# Patient Record
Sex: FEMALE | Employment: FULL TIME
[De-identification: names, ages, dates, MRNs, and addresses within clinical notes are randomized per-mention and may not be internally consistent; named-entity substitution may affect disease eponyms.]

---

## 2023-03-24 ENCOUNTER — HOSPITAL ENCOUNTER (OUTPATIENT)
Facility: HOSPITAL | Age: 38
Setting detail: RECURRING SERIES
Discharge: HOME | End: 2023-03-27
Payer: OTHER GOVERNMENT

## 2023-03-24 PROCEDURE — 97162 PT EVAL MOD COMPLEX 30 MIN: CPT

## 2023-03-24 PROCEDURE — 97535 SELF CARE MNGMENT TRAINING: CPT

## 2023-03-24 PROCEDURE — 97110 THERAPEUTIC EXERCISES: CPT

## 2023-03-25 NOTE — PROGRESS NOTES
limitation and / or participation in recreation  ;Presentation:  MEDIUM Complexity : Evolving with changing characteristics  ; Clinical Decision Making:  MEDIUM Complexity : FOTO score of 26-74 FOTO score = an established functional score where 100 = no disability  Overall Complexity Rating: MEDIUM  Problem List: pain affecting function, decrease ROM, decrease strength, impaired gait/balance, decrease ADL/functional abilities, decrease activity tolerance, decrease flexibility/joint mobility, and decrease transfer abilities    Treatment Plan may include any combination of the followin Therapeutic Exercise, 06466 Neuromuscular Re-Education, 06869 Manual Therapy, 79415 Therapeutic Activity, 82762 Self Care/Home Management, 31845 Electrical Stim unattended, 57411 Electrical Stim attended, 89971 Vasopneumatic Device, 28964 Gait Training, [id number] Ultrasound, [id number] Needle Insertion w/o Injection (1 or 2 muscles), and 32782 Needle Insertion w/o Injection (3+ muscles)  Patient / Family readiness to learn indicated by: asking questions, trying to perform skills, interest, return verbalization , and return demonstration   Persons(s) to be included in education: patient (P)  Barriers to Learning/Limitations: none  Measures taken if barriers to learning present: n/a  Patient Goal (s): \"to be mobile with little to no pain and be able to workout with pain behind my knee\"  Patient Self Reported Health Status: good  Rehabilitation Potential: good     Short Term Goals: To be accomplished in 2 treatments:     1. Patient will become proficient in their HEP and will be compliant in performing that program.     Evaluation: HEP established        Long Term Goals: To be accomplished in 5 weeks:     1. Patient's pain level will be 0-3/10 with activity in order to improve patient's ability to perform normal ADLs. Evaluation: 2-9/10       2.  Patient will demonstrate MMT right hip abd >/= 4+/5 in order to perform ADLs with greater
Tightness= [] WNL   [] Min   [] Mod   [] Severe  Gastroc:    (L) Tightness= [] WNL   [] Min   [] Mod   [] Severe    (R) Tightness= [] WNL   [] Min   [] Mod   [] Severe                                  90/90 HS:  [place]:  Pamela:    Joint Mobility:  Hypomobility of *** /UPAs  Acetabulofemoral: Hypomobility w/ inf/lateral glides  Patellar: Hypomobility w/ inf<>sup, med<>lateral glides  Tibiofemoral: Hypomobility w/ AP/PA glides  Prox Tibiofiibular:  Distal Tibiofiibular:  Talocrural:  Subtalar:    Neural Tension:  Slump:       Special Tests:  SLR:  FABIR/FADIR:  LE Long Axis Distraction    [place]len:  Thigh Thrust  Scours:    Varus (at 30 deg):  Valgus (at 30 deg): Ant Drawer:  Post Drawer:  Starleen Muff: Geraldo:  Apley Compression:    Ant Drawer: Talar Tilt:       Pain Level (0-10 scale) post treatment: ***    ASSESSMENT/Changes in Function:     Patient will continue to benefit from skilled PT services to {[facility]:40501} to address functional deficits and attain remaining goals.     []  See Plan of Care - for goals and assessment     PLAN  []  Upgrade activities as tolerated     [x]  Continue plan of care  []  Update interventions per flow sheet       []  Other:_      Dayan Aguirre PT 3/24/2023  9:09 AM

## 2023-04-13 ENCOUNTER — HOSPITAL ENCOUNTER (OUTPATIENT)
Facility: HOSPITAL | Age: 38
Setting detail: RECURRING SERIES
Discharge: HOME | End: 2023-04-16
Payer: OTHER GOVERNMENT

## 2023-04-13 PROCEDURE — 97110 THERAPEUTIC EXERCISES: CPT

## 2023-04-13 PROCEDURE — 97530 THERAPEUTIC ACTIVITIES: CPT

## 2023-04-13 PROCEDURE — 97112 NEUROMUSCULAR REEDUCATION: CPT

## 2023-04-17 ENCOUNTER — HOSPITAL ENCOUNTER (OUTPATIENT)
Facility: HOSPITAL | Age: 38
Setting detail: RECURRING SERIES
Discharge: HOME | End: 2023-04-20
Payer: OTHER GOVERNMENT

## 2023-04-17 PROCEDURE — 97140 MANUAL THERAPY 1/> REGIONS: CPT

## 2023-04-17 PROCEDURE — 97530 THERAPEUTIC ACTIVITIES: CPT

## 2023-04-17 PROCEDURE — 97110 THERAPEUTIC EXERCISES: CPT

## 2023-04-17 NOTE — PROGRESS NOTES
BRIE Orlando PTA [facility]   4/28/2023  7:30 AM Maryanne South PTA [facility]

## 2023-04-18 ENCOUNTER — HOSPITAL ENCOUNTER (OUTPATIENT)
Facility: HOSPITAL | Age: 38
Setting detail: RECURRING SERIES
Discharge: HOME | End: 2023-04-21
Payer: OTHER GOVERNMENT

## 2023-04-18 PROCEDURE — 97110 THERAPEUTIC EXERCISES: CPT

## 2023-04-18 PROCEDURE — 97140 MANUAL THERAPY 1/> REGIONS: CPT

## 2023-04-18 PROCEDURE — 97530 THERAPEUTIC ACTIVITIES: CPT

## 2023-04-18 NOTE — PROGRESS NOTES
services to modify and progress therapeutic interventions, analyze and address functional mobility deficits, analyze and address ROM deficits, analyze and address strength deficits, analyze and address soft tissue restrictions, analyze and cue for proper movement patterns, analyze and modify for postural abnormalities, and instruct in home and community integration to address functional deficits and attain remaining goals. Progress toward goals / Updated goals:  []  See Progress Note/Recertification  1. Patient will become proficient in their HEP and will be compliant in performing that program.     Evaluation: HEP established  Current:ongoing reports initial compliance 4/13/23      Long Term Goals: To be accomplished in 5 weeks:     1. Patient's pain level will be 0-3/10 with activity in order to improve patient's ability to perform normal ADLs. Evaluation: 2-9/10    CURRENT    3  4/18/23     2. Patient will demonstrate MMT right hip abd >/= 4+/5 in order to perform ADLs with greater ease    Evaluation: Right hip abd MMT: 3/5   CURRENT Ongoing NA 4/13/23  3. Patient will increase FOTO score to 63 points to indicate increased functional mobility. Evaluation: 49 points      CURRENT Ongoing NA 4/13/23  4. Patient will demonstrate pain free left knee flex AROM >/= 120 degrees in order to perform ADLs with greater ease.      Evaluation:   Left Knee Flex AROM: 112 deg   CURRENT ongoing NA  4/13/23         PLAN  Yes  Continue plan of care  []  Upgrade activities as tolerated  []  Discharge due to :  []  Other:    Sabiha Suárez PTA    4/18/2023    9:01 AM    Future Appointments   Date Time Provider Brannon Lee   4/26/2023  8:10 AM Sabiha Suárez PTA [facility]   4/28/2023  7:30 AM [facility]

## 2023-04-26 ENCOUNTER — HOSPITAL ENCOUNTER (OUTPATIENT)
Facility: HOSPITAL | Age: 38
Setting detail: RECURRING SERIES
Discharge: HOME | End: 2023-04-29
Payer: OTHER GOVERNMENT

## 2023-04-26 PROCEDURE — 97110 THERAPEUTIC EXERCISES: CPT

## 2023-04-26 PROCEDURE — 97530 THERAPEUTIC ACTIVITIES: CPT

## 2023-04-26 NOTE — PROGRESS NOTES
PHYSICAL / OCCUPATIONAL THERAPY - DAILY TREATMENT NOTE (updated )    Patient Name: Renny Simon    Date: 2023    : 1985  Insurance: Payor:  EAST / Plan: [employer] EAST / Product Type: *No Product type* /      Patient  verified Yes     Visit #   Current / Total 6 10   Time   In / Out 8:12 9:00   Pain   In / Out 5 0   Subjective Functional Status/Changes: \"I went back to gym. \"   Changes to:  Meds, Allergies, Med Hx, Sx Hx? If yes, update Summary List no       TREATMENT AREA =  Pain in right knee [M25.561]  Pain in left knee [M25.562]    OBJECTIVE    Modalities Rationale:     decrease pain to improve patient's ability to progress to PLOF and address remaining functional goals. min [] Estim Unattended, type/location:                                      []  w/ice    []  w/heat    min [] Estim Attended, type/location:                                     []  w/US     []  w/ice    []  w/heat    []  TENS insruct      min []  Mechanical Traction: type/lbs                   []  pro   []  sup   []  int   []  cont    []  before manual    []  after manual    min []  Ultrasound, settings/location:      min []  Iontophoresis w/ dexamethasone, location:                                               []  take home patch       []  in clinic   10 min  unbill [x]  Ice     []  Heat    location/position: (B) knees  long sit    min []  Paraffin,  details:     min []  Vasopneumatic Device, press/temp:     min []  Berkeley Peaches / Manette Fairly: If using vaso (only need to measure limb vaso being performed on)      pre-treatment girth :       post-treatment girth :       measured at (landmark location) :      min []  Other:    Skin assessment post-treatment (if applicable):    []  intact    []  redness- no adverse reaction                 []redness - adverse reaction:         Therapeutic Procedures:     Tx Min Billable or 1:1 Min (if diff from Tx Min) Procedure, Rationale, Specifics   15  46763 Therapeutic Exercise

## 2023-04-26 NOTE — PROGRESS NOTES
[address] [place] [address] PHYSICAL THERAPY  [address] [place] [address], [address] [place] [address],[facility] 300  [phone] AN:[phone]  PHYSICAL THERAPY PROGRESS NOTE  Patient Name: Jenny  : 1985   Treatment/Medical Diagnosis: Pain in right knee [M25.561]  Pain in left knee [M25.562]   Referral Source: SUSAN Carcamo     Date of Initial Visit: 3/24/23 Attended Visits: 6 Missed Visits: 0     SUMMARY OF TREATMENT  Pt has attended 6 visits,including initial evaluation for (B) knee pain. PT intervention has consisted of therapeutic, exercises functional activities, manual therapy, neuromuscular re-education,HEP to improve ROM, mobility,flexibility, and improve pt's ability to squat,bend, and lift increasing pt's ability to perform ADL's and occupational demands. CP utilized for pain management. CURRENT STATUS  Pt reports 25% improvement since initial eval. FOTO has improved from 49 to 51 which shows some improvement since initial eval. MMT for right hip abduction and AROM left knee have improved. Pain level on average is 4/10 and on a busy day it increases to 6-7/10. Pt is able to perform normal workout routine with less repetition since she started PT. Pt reports difficulty with ascending stairs, standing from seated position causes pain, and buckling when she tries to stand up from seated position      Progress Towards Goals  1. Patient will become proficient in their HEP and will be compliant in performing that program.     Evaluation: HEP established  Current:ongoing reports initial compliance 23      Long Term Goals: To be accomplished in 5 weeks:     1. Patient's pain level will be 0-3/10 with activity in order to improve patient's ability to perform normal ADLs. Evaluation: 2-9/10    CURRENT  5-6     23 Progressing     2.  Patient will demonstrate MMT right hip abd >/= 4+/5 in order to perform ADLs with greater ease    Evaluation: Right hip abd MMT: 3/5

## 2023-04-28 ENCOUNTER — HOSPITAL ENCOUNTER (OUTPATIENT)
Facility: HOSPITAL | Age: 38
Setting detail: RECURRING SERIES
End: 2023-04-28
Payer: OTHER GOVERNMENT

## 2023-05-10 ENCOUNTER — HOSPITAL ENCOUNTER (OUTPATIENT)
Facility: HOSPITAL | Age: 38
Setting detail: RECURRING SERIES
Discharge: HOME | End: 2023-05-13
Payer: OTHER GOVERNMENT

## 2023-05-10 PROCEDURE — 97530 THERAPEUTIC ACTIVITIES: CPT

## 2023-05-10 PROCEDURE — 97112 NEUROMUSCULAR REEDUCATION: CPT

## 2023-05-10 PROCEDURE — 97110 THERAPEUTIC EXERCISES: CPT

## 2023-05-10 NOTE — PROGRESS NOTES
Rationale, Specifics   24  68735 Therapeutic Exercise (timed):  increase ROM, strength, coordination, balance, and proprioception to improve patient's ability to progress to PLOF and address remaining functional goals. (see flow sheet as applicable)     Details if applicable:       15  60564 Therapeutic Activity (timed):  use of dynamic activities replicating functional movements to increase ROM, strength, coordination, balance, and proprioception in order to improve patient's ability to progress to PLOF and address remaining functional goals. (see flow sheet as applicable)     Details if applicable:     8  82350 Neuromuscular Re-Education (timed):  improve balance, coordination, kinesthetic sense, posture, core stability and proprioception to improve patient's ability to develop conscious control of individual muscles and awareness of position of extremities in order to progress to PLOF and address remaining functional goals. (see flow sheet as applicable)     Details if applicable:            Details if applicable:            Details if applicable:     52 47 [facility] Totals Reminder: bill using total billable min of TIMED therapeutic procedures (example: do not include dry needle or estim unattended, both untimed codes, in totals to left)  8-22 min = 1 unit; 23-37 min = 2 units; 38-52 min = 3 units; 53-67 min = 4 units; 68-82 min = 5 units   Total Total     [x]  Patient Education billed concurrently with other procedures   [x] Review HEP    [] Progressed/Changed HEP, detail:    [] Other detail:       Objective Information/Functional Measures/Assessment    VC exercises and technique  Added 1 1/2# to standing ham curl and 3 way hip 10x each    Tolerated well with intermittent reports of right knee pain with exercises and activities    Residual C/O primarily right knee pain. Showing increase strength with addition of 1 1/2# with PRES in standing.          Patient will continue to benefit from skilled PT / OT services

## 2023-05-17 ENCOUNTER — HOSPITAL ENCOUNTER (OUTPATIENT)
Facility: HOSPITAL | Age: 38
Setting detail: RECURRING SERIES
Discharge: HOME | End: 2023-05-20
Payer: OTHER GOVERNMENT

## 2023-05-17 ENCOUNTER — TELEPHONE (OUTPATIENT)
Facility: HOSPITAL | Age: 38
End: 2023-05-17

## 2023-05-17 PROCEDURE — 97530 THERAPEUTIC ACTIVITIES: CPT

## 2023-05-17 PROCEDURE — 97112 NEUROMUSCULAR REEDUCATION: CPT

## 2023-05-17 PROCEDURE — 97110 THERAPEUTIC EXERCISES: CPT

## 2023-05-17 NOTE — TELEPHONE ENCOUNTER
Patient cancelled appt. on 5/17/23 at 8:23am due to the patient being out of town and she is not able to make her appt.

## 2023-05-17 NOTE — PROGRESS NOTES
PHYSICAL / OCCUPATIONAL THERAPY - DAILY TREATMENT NOTE (updated )    Patient Name: Emily Ho    Date: 2023    : 1985  Insurance: Payor: [employer] EAST / Plan: [employer] EAST / Product Type: *No Product type* /      Patient  verified Yes     Visit #   Current / Total 2 8   Time   In / Out 732 822   Pain   In / Out B 3-4  2 \"looser\"   Subjective Functional Status/Changes: Reports her knees both feel achy today and she is having intermittent sharp pains. Changes to:  Meds, Allergies, Med Hx, Sx Hx? If yes, update Summary List no       TREATMENT AREA =  Pain in right knee [M25.561]  Pain in left knee [M25.562]    OBJECTIVE    Modalities Rationale:     decrease pain and increase tissue extensibility to improve patient's ability to progress to PLOF and address remaining functional goals. min [] Estim Unattended, type/location:                                      []  w/ice    []  w/heat    min [] Estim Attended, type/location:                                     []  w/US     []  w/ice    []  w/heat    []  TENS insruct      min []  Mechanical Traction: type/lbs                   []  pro   []  sup   []  int   []  cont    []  before manual    []  after manual    min []  Ultrasound, settings/location:      min []  Iontophoresis w/ dexamethasone, location:                                               []  take home patch       []  in clinic   10 min  unbill []  Ice     [x]  Heat  post   location/position:reclined, B knees     min []  Paraffin,  details:     min []  Vasopneumatic Device, press/temp:     min []  Jeannie Kahn / Cora Charles: If using vaso (only need to measure limb vaso being performed on)      pre-treatment girth :       post-treatment girth :       measured at (landmark location) :      min []  Other:    Skin assessment post-treatment (if applicable):    []  intact    []  redness- no adverse reaction                 []redness - adverse reaction:         Therapeutic Procedures:     Tx Min

## 2023-05-19 ENCOUNTER — HOSPITAL ENCOUNTER (OUTPATIENT)
Facility: HOSPITAL | Age: 38
Setting detail: RECURRING SERIES
Discharge: HOME | End: 2023-05-22
Payer: OTHER GOVERNMENT

## 2023-05-19 PROCEDURE — 97112 NEUROMUSCULAR REEDUCATION: CPT

## 2023-05-19 PROCEDURE — 97110 THERAPEUTIC EXERCISES: CPT

## 2023-05-19 PROCEDURE — 97530 THERAPEUTIC ACTIVITIES: CPT

## 2023-05-23 ENCOUNTER — APPOINTMENT (OUTPATIENT)
Facility: HOSPITAL | Age: 38
End: 2023-05-23
Payer: OTHER GOVERNMENT

## 2023-05-30 ENCOUNTER — HOSPITAL ENCOUNTER (OUTPATIENT)
Facility: HOSPITAL | Age: 38
Setting detail: RECURRING SERIES
Discharge: HOME | End: 2023-06-02
Payer: OTHER GOVERNMENT

## 2023-05-30 PROCEDURE — 97530 THERAPEUTIC ACTIVITIES: CPT

## 2023-05-30 PROCEDURE — 97110 THERAPEUTIC EXERCISES: CPT

## 2023-05-30 NOTE — PROGRESS NOTES
[facility] PHYSICAL THERAPY  [address] [place] [address], [address] [place] [address],[place] [address]  BV:[phone] OC:[phone]  PHYSICAL THERAPY PROGRESS NOTE  Patient Name: Winnie Obregon : 1985   Treatment/Medical Diagnosis: Pain in right knee [M25.561]  Pain in left knee [M25.562]   Referral Source: SUSAN Lan     Date of Initial Visit: 3/24/23 Attended Visits: 10 Missed Visits: 1     SUMMARY OF TREATMENT  Pt has attended 10 visits,including initial evaluation for (B) knee pain. PT intervention has consisted of therapeutic, exercises functional activities, manual therapy, neuromuscular re-education,HEP to improve ROM, mobility,flexibility, and strength to improve pt's ability to squat,bend, and lift increasing pt's ability to perform ADL's and occupational demands.  utilized for pain management. CURRENT STATUS  Ms. Chirag Nino has shown good progress with PT. Pt reports 50% improvement since initial eval. Pain level on average is 5/10. FOTO has improved from 49 to 61 which shows improved in functional mobility. Pt is able to perform workouts,light jog and squat. Pt reports difficulty with running and ascending/descending stairs. Progress Note/Recertification  1. Pt will be able to ascend stairs with minimal to no pain. PN  mod  difficulty  CURRENT 6\" step up and downs 10x each B forward and lateral 5/10/23   held step up and downs and stairs due to increase pain B knees today 23    mod difficulty  23  2. Pt will able to stand with no buckling or pain at (B) knees from any surface. PN  not assessed   CURRENT     buckling some times  from seated position  when getting up   23  3. Patient will increase FOTO score to 63 points to indicate increased functional mobility.      PN  = 51  CURRENT  61  23       FOTO:  61  Functional Gains: workouts, light jog, squats  Functional Deficits: running, stairs  % improvement: 50%  Pain   Average: 5/10       Best:

## 2023-05-30 NOTE — PROGRESS NOTES
PHYSICAL / OCCUPATIONAL THERAPY - DAILY TREATMENT NOTE (updated )    Patient Name: Melba Bonilla    Date: 2023    : 1985  Insurance: Payor: [employer] EAST / Plan: [employer] EAST / Product Type: *No Product type* /      Patient  verified Yes     Visit #   Current / Total 1 8   Time   In / Out 7:42 8:21   Pain   In / Out 2 2   Subjective Functional Status/Changes: \"Im having most of my pain with the stairs. \"   Changes to:  Meds, Allergies, Med Hx, Sx Hx? If yes, update Summary List no       TREATMENT AREA =  Pain in right knee [M25.561]  Pain in left knee [M25.562]    OBJECTIVE    Modalities Rationale:     decrease pain to improve patient's ability to progress to PLOF and address remaining functional goals. min [] Estim Unattended, type/location:                                      []  w/ice    []  w/heat    min [] Estim Attended, type/location:                                     []  w/US     []  w/ice    []  w/heat    []  TENS insruct      min []  Mechanical Traction: type/lbs                   []  pro   []  sup   []  int   []  cont    []  before manual    []  after manual    min []  Ultrasound, settings/location:      min []  Iontophoresis w/ dexamethasone, location:                                               []  take home patch       []  in clinic   10 min  unbill [x]  Ice     []  Heat    location/position: Supine (B) knees    min []  Paraffin,  details:     min []  Vasopneumatic Device, press/temp:     min []  Erica Jaclyn / Cheyenne Savant: If using vaso (only need to measure limb vaso being performed on)      pre-treatment girth :       post-treatment girth :       measured at (landmark location) :      min []  Other:    Skin assessment post-treatment (if applicable):    []  intact    []  redness- no adverse reaction                 []redness - adverse reaction:         Therapeutic Procedures:     Tx Min Billable or 1:1 Min (if diff from Tx Min) Procedure, Rationale, Specifics   [phone] [id number]

## 2023-06-01 ENCOUNTER — HOSPITAL ENCOUNTER (OUTPATIENT)
Facility: HOSPITAL | Age: 38
Setting detail: RECURRING SERIES
Discharge: HOME | End: 2023-06-04
Payer: OTHER GOVERNMENT

## 2023-06-01 PROCEDURE — 97112 NEUROMUSCULAR REEDUCATION: CPT

## 2023-06-01 PROCEDURE — 97110 THERAPEUTIC EXERCISES: CPT

## 2023-06-01 PROCEDURE — 97530 THERAPEUTIC ACTIVITIES: CPT

## 2023-06-06 ENCOUNTER — HOSPITAL ENCOUNTER (OUTPATIENT)
Facility: HOSPITAL | Age: 38
Setting detail: RECURRING SERIES
Discharge: HOME | End: 2023-06-09
Payer: OTHER GOVERNMENT

## 2023-06-06 PROCEDURE — 97110 THERAPEUTIC EXERCISES: CPT

## 2023-06-06 PROCEDURE — 97112 NEUROMUSCULAR REEDUCATION: CPT

## 2023-06-06 PROCEDURE — 97530 THERAPEUTIC ACTIVITIES: CPT

## 2023-06-06 NOTE — PROGRESS NOTES
TM distance . 16miles with speed 2.0 x 5 minutes today. Patient will continue to benefit from skilled PT / OT services to modify and progress therapeutic interventions, analyze and address ROM deficits, analyze and address strength deficits, analyze and address soft tissue restrictions, analyze and cue for proper movement patterns, analyze and modify for postural abnormalities, and instruct in home and community integration to address functional deficits and attain remaining goals. Progress toward goals / Updated goals:  []  See Progress Note/Recertification    New Goals to be achieved in ___5_ treatments  1. 1. Pt will be able to ascend stairs with minimal to no pain  PN  mod difficulty  5/30/23  CURRENT 8\" stairs 3x reciprocal pattern with reports of increase pain to 4/10 6/6/23    2. 2. Pt will able to stand with no buckling or pain at (B) knees from any surface. PN  buckling some times  from seated position  when getting up    CURRENT reports difficulty rising up from lunge walks 6/1/23   lung walks not performed today, goal NA 6/6/23    3. 3. Patient will increase FOTO score to 63 points to indicate increased functional mobility.      PN 61  CURRENT NA 6/6/23    PLAN  Yes  Continue plan of care  [x]  Upgrade activities as tolerated  []  Discharge due to :  []  Other:    Edwin Samayoa PT    6/6/2023    7:37 AM    Future Appointments   Date Time Provider Brannon Lee   6/8/2023  7:30 AM [facility]

## 2023-06-27 ENCOUNTER — HOSPITAL ENCOUNTER (OUTPATIENT)
Facility: HOSPITAL | Age: 38
Setting detail: RECURRING SERIES
Discharge: HOME | End: 2023-06-30
Payer: OTHER GOVERNMENT

## 2023-06-27 PROCEDURE — 97110 THERAPEUTIC EXERCISES: CPT

## 2023-06-27 PROCEDURE — 97530 THERAPEUTIC ACTIVITIES: CPT

## 2023-07-13 ENCOUNTER — APPOINTMENT (OUTPATIENT)
Facility: HOSPITAL | Age: 38
End: 2023-07-13
Payer: OTHER GOVERNMENT

## 2023-07-20 ENCOUNTER — HOSPITAL ENCOUNTER (OUTPATIENT)
Facility: HOSPITAL | Age: 38
Setting detail: RECURRING SERIES
Discharge: HOME | End: 2023-07-23
Payer: OTHER GOVERNMENT

## 2023-07-20 ENCOUNTER — APPOINTMENT (OUTPATIENT)
Facility: HOSPITAL | Age: 38
End: 2023-07-20
Payer: OTHER GOVERNMENT

## 2023-07-20 PROCEDURE — 97140 MANUAL THERAPY 1/> REGIONS: CPT

## 2023-07-20 PROCEDURE — 97110 THERAPEUTIC EXERCISES: CPT

## 2023-07-20 NOTE — PROGRESS NOTES
PHYSICAL / OCCUPATIONAL THERAPY - DAILY TREATMENT NOTE (updated )    Patient Name: Marty Henderson    Date: 2023    : 1985  Insurance: Payor:  EAST / Plan: [employer] EAST / Product Type: *No Product type* /      Patient  verified Yes     Visit #   Current / Total 1 10   Time   In / Out 935 am  1025 am    Pain   In / Out 4-5/10 2/10    Subjective Functional Status/Changes: Patient reports having increased (B) knee pain when up from a seated position and when performing stairs. Patient describes the pain as being achy. TREATMENT AREA =  Pain in right knee [M25.561]  Pain in left knee [M25.562]    OBJECTIVE    Modalities Rationale:     decrease inflammation, decrease pain, and increase tissue extensibility to improve patient's ability to progress to PLOF and address remaining functional goals. min [] Estim Unattended, type/location:                                      []  w/ice    []  w/heat    min [] Estim Attended, type/location:                                     []  w/US     []  w/ice    []  w/heat    []  TENS insruct      min []  Mechanical Traction: type/lbs                   []  pro   []  sup   []  int   []  cont    []  before manual    []  after manual    min []  Ultrasound, settings/location:      min []  Iontophoresis w/ dexamethasone, location:                                               []  take home patch       []  in clinic   10 min  unbill []  Ice     [x]  Heat    location/position: Reclined to (B) knee     min []  Paraffin,  details:     min []  Vasopneumatic Device, press/temp:     min []  Derek Grover / Asiya Hernandez:     If using vaso (only need to measure limb vaso being performed on)      pre-treatment girth :       post-treatment girth :       measured at (landmark location) :      min []  Other:    Skin assessment post-treatment (if applicable):    []  intact    []  redness- no adverse reaction                 []redness - adverse reaction:         Therapeutic

## 2023-07-24 ENCOUNTER — HOSPITAL ENCOUNTER (OUTPATIENT)
Facility: HOSPITAL | Age: 38
Setting detail: RECURRING SERIES
Discharge: HOME | End: 2023-07-27
Payer: OTHER GOVERNMENT

## 2023-07-24 PROCEDURE — 97110 THERAPEUTIC EXERCISES: CPT

## 2023-07-24 PROCEDURE — 97530 THERAPEUTIC ACTIVITIES: CPT

## 2023-07-27 ENCOUNTER — HOSPITAL ENCOUNTER (OUTPATIENT)
Facility: HOSPITAL | Age: 38
Setting detail: RECURRING SERIES
Discharge: HOME | End: 2023-07-30
Payer: OTHER GOVERNMENT

## 2023-07-27 PROCEDURE — 97530 THERAPEUTIC ACTIVITIES: CPT

## 2023-07-27 PROCEDURE — 97140 MANUAL THERAPY 1/> REGIONS: CPT

## 2023-07-27 NOTE — PROGRESS NOTES
PHYSICAL / OCCUPATIONAL THERAPY - DAILY TREATMENT NOTE (updated )    Patient Name: Nikki Hernández    Date: 2023    : 1985  Insurance: Payor: [employer] EAST / Plan: [employer] EAST / Product Type: *No Product type* /      Patient  verified Yes     Visit #   Current / Total 3 10   Time   In / Out 933 am  1015 am    Pain   In / Out 3/10  2/10    Subjective Functional Status/Changes: Patient reports that knees are achy today. Patient explains that she had pain when squatting during her last workout. TREATMENT AREA =  Pain in right knee [M25.561]  Pain in left knee [M25.562]    OBJECTIVE    Therapeutic Procedures: Tx Min Billable or 1:1 Min (if diff from Tx Min) Procedure, Rationale, Specifics   27  18083 Therapeutic Activity (timed):  use of dynamic activities replicating functional movements to increase ROM, strength, coordination, balance, and proprioception in order to improve patient's ability to progress to PLOF and address remaining functional goals. (see flow sheet as applicable)    Details if applicable:       15  60258 Manual Therapy (timed):  decrease pain, increase ROM, increase tissue extensibility, decrease trigger points, and increase postural awareness to improve patient's ability to progress to PLOF and address remaining functional goals. The manual therapy interventions were performed at a separate and distinct time from the therapeutic activities interventions .  (see flow sheet as applicable)     Details if applicable:  Shot gun mobs/MET; DTM and stretching (B) piriformis and glute med               Details if applicable:            Details if applicable:            Details if applicable:     43  [facility] Totals Reminder: bill using total billable min of TIMED therapeutic procedures (example: do not include dry needle or estim unattended, both untimed codes, in totals to left)  8-22 min = 1 unit; 23-37 min = 2 units; 38-52 min = 3 units; 53-67 min = 4 units; 68-82 min = 5 units

## 2023-07-27 NOTE — PROGRESS NOTES
[facility] [facility]  [address] [facility], [address] [place] [address]  IU:[phone] EK:[phone]  PHYSICAL THERAPY PROGRESS NOTE  Patient Name: Ted Teague : 1985   Treatment/Medical Diagnosis: Pain in right knee [M25.561]  Pain in left knee [M25.562]   Referral Source: SUSAN Thompson     Date of Initial Visit: 3/24/2023 Attended Visits: 17 Missed Visits: 1       CURRENT STATUS    Patient has attended 17 total PT sessions and is progressing as expected towards PT goals. Patient reports improvement with endurance and tolerance for at the gym workouts. Patient continues to report increased (B) knee pain with stair negotiation, rising from a seated position, kneeling on knees and with squats. Patient reports 50% improvement with PT and will continue to benefit from skilled PT / OT services to modify and progress therapeutic interventions, analyze and address functional mobility deficits, analyze and address ROM deficits, analyze and address strength deficits, analyze and address soft tissue restrictions, analyze and cue for proper movement patterns, analyze and modify for postural abnormalities, and instruct in home and community integration to address functional deficits and attain remaining goals. Functional Gains: Able to do more with working out  Functional Deficits: Stair negotiation, rising from seated position, kneeling down   % improvement: 50% improvement  Pain   Average: 4/10                  Best: 0/10                Worst: 7/10  Patient Goal: \"To continue to build strength in my knee so that I can work out like I want to and be able to walk without discomfort. \"       1. Pt will be able to ascend stairs with minimal to no pain to carryover to ADL. PN  mod difficulty  Current: Patient experienced increased (B) knee pain with stairs. Progressing      2.  Patient will increase FOTO score to 63 points to indicate increased functional

## 2023-07-31 ENCOUNTER — HOSPITAL ENCOUNTER (OUTPATIENT)
Facility: HOSPITAL | Age: 38
Setting detail: RECURRING SERIES
Discharge: HOME | End: 2023-08-03
Payer: OTHER GOVERNMENT

## 2023-07-31 PROCEDURE — 97110 THERAPEUTIC EXERCISES: CPT

## 2023-07-31 PROCEDURE — 97530 THERAPEUTIC ACTIVITIES: CPT

## 2023-07-31 PROCEDURE — 97140 MANUAL THERAPY 1/> REGIONS: CPT

## 2023-07-31 NOTE — PROGRESS NOTES
AM MARISABEL GarciaPTCS [facility]   8/17/2023  8:10 AM MARISABEL Garcia [facility]

## 2023-08-03 ENCOUNTER — HOSPITAL ENCOUNTER (OUTPATIENT)
Facility: HOSPITAL | Age: 38
Setting detail: RECURRING SERIES
Discharge: HOME | End: 2023-08-06
Payer: OTHER GOVERNMENT

## 2023-08-03 PROCEDURE — 97140 MANUAL THERAPY 1/> REGIONS: CPT

## 2023-08-03 PROCEDURE — 97530 THERAPEUTIC ACTIVITIES: CPT

## 2023-08-03 PROCEDURE — 97110 THERAPEUTIC EXERCISES: CPT

## 2023-08-03 NOTE — PROGRESS NOTES
AM    Future Appointments   Date Time Provider Department Center   8/3/2023  8:50 AM Venda Nip, PTA [facility]   8/8/2023 10:50 AM Venda Nip, PTA [facility]   8/10/2023  8:10 AM Venda Nip, PTA [facility]   8/14/2023  8:50 AM Venda Nip, PTA [facility]   8/17/2023  8:10 AM Venda Nip, PTA [facility]

## 2023-08-07 ENCOUNTER — APPOINTMENT (OUTPATIENT)
Facility: HOSPITAL | Age: 38
End: 2023-08-07
Payer: OTHER GOVERNMENT

## 2023-08-08 ENCOUNTER — HOSPITAL ENCOUNTER (OUTPATIENT)
Facility: HOSPITAL | Age: 38
Setting detail: RECURRING SERIES
Discharge: HOME | End: 2023-08-11
Payer: OTHER GOVERNMENT

## 2023-08-08 PROCEDURE — 97530 THERAPEUTIC ACTIVITIES: CPT

## 2023-08-08 PROCEDURE — 97140 MANUAL THERAPY 1/> REGIONS: CPT

## 2023-08-08 PROCEDURE — 97110 THERAPEUTIC EXERCISES: CPT

## 2023-08-08 NOTE — PROGRESS NOTES
PHYSICAL / OCCUPATIONAL THERAPY - DAILY TREATMENT NOTE (updated )    Patient Name: Arnie Quinteros    Date: 2023    : 1985  Insurance: Payor: [employer] / Plan: [employer] / Product Type: *No Product type* /      Patient  verified Yes     Visit #   Current / Total 3 8   Time   In / Out 1050 am  1140 am   Pain   In / Out 0/10  0/10    Subjective Functional Status/Changes: Patient denies any (B) knee pain today. Patient reports that she was able run some yesterday and this morning with a little bit of left knee pain. TREATMENT AREA =  Pain in right knee [M25.561]  Pain in left knee [M25.562]    OBJECTIVE    Modalities Rationale:     decrease edema, decrease inflammation, decrease pain, and increase tissue extensibility to improve patient's ability to progress to PLOF and address remaining functional goals. min [] Estim Unattended, type/location:                                      []  w/ice    []  w/heat    min [] Estim Attended, type/location:                                     []  w/US     []  w/ice    []  w/heat    []  TENS insruct      min []  Mechanical Traction: type/lbs                   []  pro   []  sup   []  int   []  cont    []  before manual    []  after manual    min []  Ultrasound, settings/location:      min []  Iontophoresis w/ dexamethasone, location:                                               []  take home patch       []  in clinic   10 min  unbill []  Ice     [x]  Heat    location/position: Reclined to (B) knees    min []  Paraffin,  details:     min []  Vasopneumatic Device, press/temp:     min []  Sharion Jeffers / Asia Laws:     If using vaso (only need to measure limb vaso being performed on)      pre-treatment girth :       post-treatment girth :       measured at (landmark location) :      min []  Other:    Skin assessment post-treatment (if applicable):    []  intact    []  redness- no adverse reaction                 []redness - adverse reaction:         Therapeutic

## 2023-08-10 ENCOUNTER — HOSPITAL ENCOUNTER (OUTPATIENT)
Facility: HOSPITAL | Age: 38
Setting detail: RECURRING SERIES
End: 2023-08-10
Payer: OTHER GOVERNMENT

## 2023-08-14 ENCOUNTER — APPOINTMENT (OUTPATIENT)
Facility: HOSPITAL | Age: 38
End: 2023-08-14
Payer: OTHER GOVERNMENT

## 2023-08-17 ENCOUNTER — APPOINTMENT (OUTPATIENT)
Facility: HOSPITAL | Age: 38
End: 2023-08-17
Payer: OTHER GOVERNMENT

## 2023-08-18 ENCOUNTER — HOSPITAL ENCOUNTER (OUTPATIENT)
Facility: HOSPITAL | Age: 38
Setting detail: RECURRING SERIES
End: 2023-08-18
Payer: OTHER GOVERNMENT

## 2023-08-21 ENCOUNTER — HOSPITAL ENCOUNTER (OUTPATIENT)
Facility: HOSPITAL | Age: 38
Setting detail: RECURRING SERIES
Discharge: HOME | End: 2023-08-24
Payer: OTHER GOVERNMENT

## 2023-08-21 PROCEDURE — 97530 THERAPEUTIC ACTIVITIES: CPT

## 2023-08-21 PROCEDURE — 97110 THERAPEUTIC EXERCISES: CPT

## 2023-08-21 NOTE — PROGRESS NOTES
PHYSICAL / OCCUPATIONAL THERAPY - DAILY TREATMENT NOTE (updated )    Patient Name: Sancho Porras    Date: 2023    : 1985  Insurance: Payor: [employer] EAST / Plan: [employer] EAST / Product Type: *No Product type* /      Patient  verified Yes     Visit #   Current / Total 4 8   Time   In / Out 740 824   Pain   In / Out 2 1   Subjective Functional Status/Changes: Patient complains R knee pain 2/10 , no pain in L knee . Patient complians of pain in B knee  during stair climbing with one side railing at home      TREATMENT AREA =  Pain in right knee [M25.561]  Pain in left knee [M25.562]    OBJECTIVE         Therapeutic Procedures: Tx Min Billable or 1:1 Min (if diff from Tx Min) Procedure, Rationale, Specifics    25 [id number] Therapeutic Exercise (timed):  increase ROM, strength, coordination, balance, and proprioception to improve patient's ability to progress to PLOF and address remaining functional goals. (see flow sheet as applicable)     Details if applicable:        19 01413 Therapeutic Activity (timed):  use of dynamic activities replicating functional movements to increase ROM, strength, coordination, balance, and proprioception in order to improve patient's ability to progress to PLOF and address remaining functional goals.   (see flow sheet as applicable)     Details if applicable:            Details if applicable:            Details if applicable:            Details if applicable:       [facility] Totals Reminder: bill using total billable min of TIMED therapeutic procedures (example: do not include dry needle or estim unattended, both untimed codes, in totals to left)  8-22 min = 1 unit; 23-37 min = 2 units; 38-52 min = 3 units; 53-67 min = 4 units; 68-82 min = 5 units   Total Total     [x]  Patient Education billed concurrently with other procedures   [x] Review HEP    [] Progressed/Changed HEP, detail:    [] Other detail:       Objective Information/Functional Measures/Assessment    Patient needs

## 2023-08-24 ENCOUNTER — HOSPITAL ENCOUNTER (OUTPATIENT)
Facility: HOSPITAL | Age: 38
Setting detail: RECURRING SERIES
Discharge: HOME | End: 2023-08-27
Payer: OTHER GOVERNMENT

## 2023-08-24 PROCEDURE — 97110 THERAPEUTIC EXERCISES: CPT

## 2023-08-24 PROCEDURE — 97035 APP MDLTY 1+ULTRASOUND EA 15: CPT

## 2023-08-24 PROCEDURE — 97530 THERAPEUTIC ACTIVITIES: CPT

## 2023-08-24 NOTE — PROGRESS NOTES
[facility] [employer] [facility]  [address]10 [facility], [address] [place] [address]  WP:[phone] OK:[phone]  PHYSICAL THERAPY PROGRESS NOTE  Patient Name: Lindsey Larkin : 1985   Treatment/Medical Diagnosis: Pain in right knee [M25.561]  Pain in left knee [M25.562]   Referral Source: SUSAN Ng     Date of Initial Visit: 3/24/2023 Attended Visits: 22 Missed Visits: 3       CURRENT STATUS    Patient has 22 total PT sessions and is progressing as expected. Patient's FOTO assessment score, B knee strength and overall pain have improved since the start of PT. Patient continues to have B knee pain with stair negotiation and with squats during workouts. Patient reports 70-75% improvement and would like to continue with PT to further improve remaining deficits. Patient will continue to benefit from skilled PT / OT services to modify and progress therapeutic interventions, analyze and address functional mobility deficits, analyze and address ROM deficits, analyze and address strength deficits, analyze and address soft tissue restrictions, analyze and cue for proper movement patterns, analyze and modify for postural abnormalities, and instruct in home and community integration to address functional deficits and attain remaining goals. 1.Patient will demonstrate MMT right hip abd >/= 4+/5 in order to perform ADLs with greater ease    PN: Right hip abd MMT: 4-/5  Current: Right hip abd MMT: 5/5. MET      2. Patient will negotiate 8\" stairs with reciprocal pattern and no increase in (B) knee pain in order to perform ADL's with greater ease. PN: Patient has increased (B) knee pain with stair negotiation. Current: Patient continues to experienced B knee pain with stairs     3. Patient will increase FOTO score to 63 points to indicate increased functional mobility. PN  59  Current: 61 points. Progressing      4.  Patient will tolerate 20# goblet squats with no
reaction                 []redness - adverse reaction:         Therapeutic Procedures: Tx Min Billable or 1:1 Min (if diff from Tx Min) Procedure, Rationale, Specifics    15 37456 Therapeutic Exercise (timed):  increase ROM, strength, coordination, balance, and proprioception to improve patient's ability to progress to PLOF and address remaining functional goals. (see flow sheet as applicable)     Details if applicable:        15 23825 Therapeutic Activity (timed):  use of dynamic activities replicating functional movements to increase ROM, strength, coordination, balance, and proprioception in order to improve patient's ability to progress to PLOF and address remaining functional goals. (see flow sheet as applicable)     Details if applicable:             Details if applicable:              Details if applicable:            Details if applicable:      27 [facility] Totals Reminder: bill using total billable min of TIMED therapeutic procedures (example: do not include dry needle or estim unattended, both untimed codes, in totals to left)  8-22 min = 1 unit; 23-37 min = 2 units; 38-52 min = 3 units; 53-67 min = 4 units; 68-82 min = 5 units   Total Total     [x]  Patient Education billed concurrently with other procedures   [x] Review HEP    [] Progressed/Changed HEP, detail:    [] Other detail:       Objective Information/Functional Measures/Assessment    Functional Gains: Overall pain, lifting heavier during workouts   Functional Deficits: Stair negotiation, squats  % improvement: 70-75% improvement  Pain   Average: 2/10       Best: 0-1/10     Worst: 5-6/10  Patient Goal: \"To continue to strengthen my knees and hips. \"     Patient has 22 total PT sessions and is progressing as expected. Patient's FOTO assessment score, B knee strength and overall pain have improved since the start of PT. Patient continues to have B knee pain with stair negotiation and with squats during workouts.  Patient reports 70-75% improvement and

## 2023-09-01 ENCOUNTER — HOSPITAL ENCOUNTER (OUTPATIENT)
Facility: HOSPITAL | Age: 38
Setting detail: RECURRING SERIES
Discharge: HOME | End: 2023-09-04
Payer: OTHER GOVERNMENT

## 2023-09-01 PROCEDURE — 97035 APP MDLTY 1+ULTRASOUND EA 15: CPT

## 2023-09-01 PROCEDURE — 97530 THERAPEUTIC ACTIVITIES: CPT

## 2023-09-01 NOTE — PROGRESS NOTES
PHYSICAL / OCCUPATIONAL THERAPY - DAILY TREATMENT NOTE (updated )    Patient Name: Elenita Copeland    Date: 2023    : 1985  Insurance: Payor: [employer] E[employer] / Plan: [employer] / Product Type: *No Product type* /      Patient  verified Yes     Visit #   Current / Total 1 3   Time   In / Out 258 pm  345 pm    Pain   In / Out 1/10  0/10    Subjective Functional Status/Changes: Patient reports having different pain now when she does have pain. \"The pain is now minimal when going down the stairs and just achy with squats. TREATMENT AREA =  Pain in right knee [M25.561]  Pain in left knee [M25.562]    OBJECTIVE    Modalities Rationale:     decrease inflammation, decrease pain, and increase tissue extensibility to improve patient's ability to progress to PLOF and address remaining functional goals. min [] Estim Unattended, type/location:                                      []  w/ice    []  w/heat    min [] Estim Attended, type/location:                                     []  w/US     []  w/ice    []  w/heat    []  TENS insruct      min []  Mechanical Traction: type/lbs                   []  pro   []  sup   []  int   []  cont    []  before manual    []  after manual   6' ea min []  Ultrasound, settings/location:  1.3 w/cm3   10 min  unbill []  Ice     [x]  Heat    location/position:     min []  Paraffin,  details:     min []  Vasopneumatic Device, press/temp:     min []  Megan Smith / Javy Clotilde: If using vaso (only need to measure limb vaso being performed on)      pre-treatment girth :       post-treatment girth :       measured at (landmark location) :      min []  Other:    Skin assessment post-treatment:   Intact      Therapeutic Procedures:     Tx Min Billable or 1:1 Min (if diff from Tx Min) Procedure, Rationale, Specifics   86 07504 Therapeutic Activity (timed):  use of dynamic activities replicating functional movements to increase ROM, strength, coordination, balance, and proprioception in

## 2023-09-06 ENCOUNTER — HOSPITAL ENCOUNTER (OUTPATIENT)
Facility: HOSPITAL | Age: 38
Setting detail: RECURRING SERIES
Discharge: HOME | End: 2023-09-09
Payer: OTHER GOVERNMENT

## 2023-09-06 PROCEDURE — 97110 THERAPEUTIC EXERCISES: CPT

## 2023-09-06 PROCEDURE — 97035 APP MDLTY 1+ULTRASOUND EA 15: CPT

## 2023-09-06 PROCEDURE — 97530 THERAPEUTIC ACTIVITIES: CPT

## 2023-09-06 NOTE — PROGRESS NOTES
PHYSICAL / OCCUPATIONAL THERAPY - DAILY TREATMENT NOTE (updated )    Patient Name: Graciela Howe    Date: 2023    : 1985  Insurance: Payor:  EAST / Plan: [employer]AST / Product Type: *No Product type* /      Patient  verified Yes     Visit #   Current / Total 2 3   Time   In / Out 1253 pm  148 pm   Pain   In / Out 0/10  010   Subjective Functional Status/Changes: \"My knee aren't hurting. They are just a little achy. \"      TREATMENT AREA =  Pain in right knee [M25.561]  Pain in left knee [M25.562]    OBJECTIVE    Modalities Rationale:     decrease inflammation, decrease pain, and increase tissue extensibility to improve patient's ability to progress to PLOF and address remaining functional goals. min [] Estim Unattended, type/location:                                      []  w/ice    []  w/heat    min [] Estim Attended, type/location:                                     []  w/US     []  w/ice    []  w/heat    []  TENS insruct      min []  Mechanical Traction: type/lbs                   []  pro   []  sup   []  int   []  cont    []  before manual    []  after manual   6' ea min []  Ultrasound, settings/location:  1.3 w/cm3   10 min  unbill []  Ice     [x]  Heat    location/position:     min []  Paraffin,  details:     min []  Vasopneumatic Device, press/temp:     min []  Jamie Rico / Godwin Ligia: If using vaso (only need to measure limb vaso being performed on)      pre-treatment girth :       post-treatment girth :       measured at (landmark location) :      min []  Other:    Skin assessment post-treatment:   Intact      Therapeutic Procedures:     Tx Min Billable or 1:1 Min (if diff from Tx Min) Procedure, Rationale, Specifics   18  80425 Therapeutic Activity (timed):  use of dynamic activities replicating functional movements to increase ROM, strength, coordination, balance, and proprioception in order to improve patient's ability to progress to PLOF and address remaining functional

## 2023-09-11 ENCOUNTER — TELEPHONE (OUTPATIENT)
Facility: HOSPITAL | Age: 38
End: 2023-09-11

## 2023-09-11 ENCOUNTER — APPOINTMENT (OUTPATIENT)
Facility: HOSPITAL | Age: 38
End: 2023-09-11
Payer: OTHER GOVERNMENT

## 2023-09-18 ENCOUNTER — HOSPITAL ENCOUNTER (OUTPATIENT)
Facility: HOSPITAL | Age: 38
Setting detail: RECURRING SERIES
Discharge: HOME | End: 2023-09-21
Payer: OTHER GOVERNMENT

## 2023-09-18 PROCEDURE — 97110 THERAPEUTIC EXERCISES: CPT

## 2023-09-18 PROCEDURE — 97035 APP MDLTY 1+ULTRASOUND EA 15: CPT

## 2023-09-18 PROCEDURE — 97140 MANUAL THERAPY 1/> REGIONS: CPT

## 2023-09-18 PROCEDURE — 97530 THERAPEUTIC ACTIVITIES: CPT

## 2023-09-18 NOTE — PROGRESS NOTES
PHYSICAL / OCCUPATIONAL THERAPY - DAILY TREATMENT NOTE (updated )    Patient Name: Edmund Loera    Date: 2023    : 1985  Insurance: Payor: [employer] / Plan: [employer] / Product Type: *No Product type* /      Patient  verified Yes     Visit #   Current / Total 3 3   Time   In / Out 932 am  1035 am    Pain   In / Out 0/10 0/10    Subjective Functional Status/Changes: Patient reports that her knees are achy today. TREATMENT AREA =  Pain in right knee [M25.561]  Pain in left knee [M25.562]    OBJECTIVE    Modalities Rationale:     decrease inflammation, decrease pain, and increase tissue extensibility to improve patient's ability to progress to PLOF and address remaining functional goals. min [] Estim Unattended, type/location:                                      []  w/ice    []  w/heat    min [] Estim Attended, type/location:                                     []  w/US     []  w/ice    []  w/heat    []  TENS insruct      min []  Mechanical Traction: type/lbs                   []  pro   []  sup   []  int   []  cont    []  before manual    []  after manual   6' ea min []  Ultrasound, settings/location:  1.3 w/cm3   10 min  unbill []  Ice     [x]  Heat    location/position:     min []  Paraffin,  details:     min []  Vasopneumatic Device, press/temp:     min []  Lannis [place] / Delmi Glasswa: If using vaso (only need to measure limb vaso being performed on)      pre-treatment girth :       post-treatment girth :       measured at (landmark location) :      min []  Other:    Skin assessment post-treatment:   Intact      Therapeutic Procedures: Tx Min Billable or 1:1 Min (if diff from Tx Min) Procedure, Rationale, Specifics   23  16524 Therapeutic Activity (timed):  use of dynamic activities replicating functional movements to increase ROM, strength, coordination, balance, and proprioception in order to improve patient's ability to progress to PLOF and address remaining functional goals.

## 2023-09-18 NOTE — PROGRESS NOTES
[facility] [facility]  1710 [facility], [address] [place] [address]  LS:[phone] IV:[phone]  PHYSICAL THERAPY PROGRESS NOTE  Patient Name: Kayla Mandujano : 1985   Treatment/Medical Diagnosis: Pain in right knee [M25.561]  Pain in left knee [M25.562]   Referral Source: SUSAN Estes     Date of Initial Visit: 3/24/2023 Attended Visits: 25  Missed Visits: 4       CURRENT STATUS    Patient has attended all authorized PT appointments and has progressed well towards PT goals. Ultrasound has been performed to B knees and patient has responded well, as evident by, decreased reports of overall B knee pain. Patient's FOTO assessment score, overall B knee pain and strength, stair negotiation and ability to progress home workouts has improved since last assessed. Patient continues to report B knee pain when squatting and stooping down. Patient also continues to present to PT with SI dysfunction which improves with manual technique. Patient reports 70-75% improvement with PT and would like to continue in order to return to PLOF. Patient will continue to benefit from skilled PT / OT services to modify and progress therapeutic interventions, analyze and address functional mobility deficits, analyze and address ROM deficits, analyze and address strength deficits, analyze and address soft tissue restrictions, analyze and cue for proper movement patterns, analyze and address imbalance/dizziness, and instruct in home and community integration to address functional deficits and attain remaining goals. 1. Patient will negotiate 8\" stairs with reciprocal pattern and no increase in (B) knee pain in order to perform ADL's with greater ease. PN: Patient continues to experienced B knee pain with stairs  Current: Patient able to tolerate 8\" stairs with reciprocal pattern and no increase in B knee pain. MET      2.  Patient will increase FOTO score to 63 points to

## 2024-03-18 NOTE — PROGRESS NOTES
proprioception to improve patient's ability to progress to PLOF and address remaining functional goals. (see flow sheet as applicable)     Details if applicable:       15  [id number] Therapeutic Activity (timed):  use of dynamic activities replicating functional movements to increase ROM, strength, coordination, balance, and proprioception in order to improve patient's ability to progress to PLOF and address remaining functional goals. (see flow sheet as applicable)     Details if applicable:            Details if applicable:            Details if applicable:            Details if applicable:     45  [facility] Totals Reminder: bill using total billable min of TIMED therapeutic procedures (example: do not include dry needle or estim unattended, both untimed codes, in totals to left)  8-22 min = 1 unit; 23-37 min = 2 units; 38-52 min = 3 units; 53-67 min = 4 units; 68-82 min = 5 units   Total Total     [x]  Patient Education billed concurrently with other procedures   [x] Review HEP    [] Progressed/Changed HEP, detail:    [] Other detail:       Objective Information/Functional Measures/Assessment  Pt experienced minimal increased pain with lunges but pt was able to perform at midrange. Overall pt completed each strengthening there ex well with residual pain. Pt was able to ambulate on TM 6 min with no increased pain with good step length. Patient will continue to benefit from skilled PT / OT services to modify and progress therapeutic interventions, analyze and address functional mobility deficits, analyze and address ROM deficits, analyze and address strength deficits, analyze and address soft tissue restrictions, analyze and cue for proper movement patterns, analyze and modify for postural abnormalities, and instruct in home and community integration to address functional deficits and attain remaining goals. Progress toward goals / Updated goals:  []  See Progress Note/Recertification  1.   Pt will be able to Dr. Dillon

## 2024-05-08 ENCOUNTER — HOSPITAL ENCOUNTER (OUTPATIENT)
Facility: HOSPITAL | Age: 39
Discharge: HOME | End: 2024-05-11
Attending: ORTHOPAEDIC SURGERY
Payer: OTHER GOVERNMENT

## 2024-05-08 DIAGNOSIS — S33.5XXA SPRAIN OF LUMBAR REGION, INITIAL ENCOUNTER: ICD-10-CM

## 2024-05-08 PROCEDURE — 72148 MRI LUMBAR SPINE W/O DYE: CPT
